# Patient Record
Sex: MALE | Race: ASIAN | NOT HISPANIC OR LATINO | Employment: FULL TIME | URBAN - METROPOLITAN AREA
[De-identification: names, ages, dates, MRNs, and addresses within clinical notes are randomized per-mention and may not be internally consistent; named-entity substitution may affect disease eponyms.]

---

## 2021-03-28 ENCOUNTER — IMMUNIZATIONS (OUTPATIENT)
Dept: FAMILY MEDICINE CLINIC | Facility: HOSPITAL | Age: 21
End: 2021-03-28

## 2021-03-28 DIAGNOSIS — Z23 ENCOUNTER FOR IMMUNIZATION: Primary | ICD-10-CM

## 2021-03-28 PROCEDURE — 0001A SARS-COV-2 / COVID-19 MRNA VACCINE (PFIZER-BIONTECH) 30 MCG: CPT

## 2021-03-28 PROCEDURE — 91300 SARS-COV-2 / COVID-19 MRNA VACCINE (PFIZER-BIONTECH) 30 MCG: CPT

## 2021-03-31 ENCOUNTER — NURSE TRIAGE (OUTPATIENT)
Dept: OTHER | Facility: OTHER | Age: 21
End: 2021-03-31

## 2021-03-31 DIAGNOSIS — B34.9 VIRAL SYNDROME: ICD-10-CM

## 2021-03-31 DIAGNOSIS — B34.9 VIRAL SYNDROME: Primary | ICD-10-CM

## 2021-03-31 PROCEDURE — U0005 INFEC AGEN DETEC AMPLI PROBE: HCPCS | Performed by: FAMILY MEDICINE

## 2021-03-31 PROCEDURE — U0003 INFECTIOUS AGENT DETECTION BY NUCLEIC ACID (DNA OR RNA); SEVERE ACUTE RESPIRATORY SYNDROME CORONAVIRUS 2 (SARS-COV-2) (CORONAVIRUS DISEASE [COVID-19]), AMPLIFIED PROBE TECHNIQUE, MAKING USE OF HIGH THROUGHPUT TECHNOLOGIES AS DESCRIBED BY CMS-2020-01-R: HCPCS | Performed by: FAMILY MEDICINE

## 2021-03-31 NOTE — TELEPHONE ENCOUNTER
1  Were you within 6 feet or less, for up to 15 minutes or more with a person that has a confirmed COVID-19 test?     No known exposure    2  What was the date of your exposure? No exposure; symptoms started after receiving vaccine    3  Are you experiencing any symptoms attributed to the virus?  (Assess for SOB, cough, fever, difficulty breathing)     Headache, coughing, fatigue, fever (up to 101F previously, 98F today), chills    4   HIGH RISK: Do you have any history heart or lung conditions, weakened immune system, diabetes, Asthma, CHF, HIV, COPD, Chemo, renal failure, sickle cell, etc?     Denies

## 2021-03-31 NOTE — TELEPHONE ENCOUNTER
Regarding: SYMPTOMATIC - HEADACHE - COVID TEST REQUEST  ----- Message from [facility] sent at 3/31/2021 10:56 AM EDT -----  "I need to be tested having COVID symptoms; headache, coughing, muscle pain in back, fatigue " However he got his first shot Servando 3/28

## 2021-03-31 NOTE — TELEPHONE ENCOUNTER
Patient advised that he needs to stay home from work until he receives his test results, at minimum, he verbalized understanding of instructions  Patient states he does have health insurance but it is his parent's policy and he does not currently have the information  Billing office number was provided so that he can call with the information when it is available

## 2021-04-01 ENCOUNTER — TELEPHONE (OUTPATIENT)
Dept: OTHER | Facility: OTHER | Age: 21
End: 2021-04-01

## 2021-04-01 LAB — SARS-COV-2 RNA RESP QL NAA+PROBE: POSITIVE

## 2021-04-01 NOTE — TELEPHONE ENCOUNTER
Your test for the novel coronavirus, also known as COVID-19, was positive  The sample showed that the virus was present  Positive COVID-19 test results are reportable to the [place] Department of Health  You may receive a call from trained public health staff to conduct an interview  It is important to answer their call  They will ask you to verify who you are  During the call they will ask you about what symptoms you have, what you did before you got sick, and who you were close to while sick  The health department does this to make sure everyone stays healthy and to reduce the spread of the virus  If you would like to verify if the caller does in fact work in contact tracing, call the [address] [place] [address] at [employer] ([phone])  For additional information, please visit the [employer] website: www health [place] gov     If you have any additional questions, we can schedule a virtual visit for you with a provider or call the [facility] [phone], option [phone], for care advice    For additional information, please visit the Coronavirus FAQ on the [facility] home page ([email or web address])

## 2021-04-18 ENCOUNTER — IMMUNIZATIONS (OUTPATIENT)
Dept: FAMILY MEDICINE CLINIC | Facility: HOSPITAL | Age: 21
End: 2021-04-18

## 2021-04-18 DIAGNOSIS — Z23 ENCOUNTER FOR IMMUNIZATION: Primary | ICD-10-CM

## 2021-04-18 PROCEDURE — 0002A SARS-COV-2 / COVID-19 MRNA VACCINE (PFIZER-BIONTECH) 30 MCG: CPT

## 2021-04-18 PROCEDURE — 91300 SARS-COV-2 / COVID-19 MRNA VACCINE (PFIZER-BIONTECH) 30 MCG: CPT

## 2021-11-27 ENCOUNTER — IMMUNIZATIONS (OUTPATIENT)
Dept: FAMILY MEDICINE CLINIC | Facility: HOSPITAL | Age: 21
End: 2021-11-27

## 2021-11-27 DIAGNOSIS — Z23 ENCOUNTER FOR IMMUNIZATION: Primary | ICD-10-CM

## 2021-11-27 PROCEDURE — 91300 COVID-19 PFIZER VACC 0.3 ML: CPT

## 2021-11-27 PROCEDURE — 0001A COVID-19 PFIZER VACC 0.3 ML: CPT

## 2022-06-22 ENCOUNTER — OFFICE VISIT (OUTPATIENT)
Dept: INTERNAL MEDICINE CLINIC | Facility: CLINIC | Age: 22
End: 2022-06-22
Payer: COMMERCIAL

## 2022-06-22 VITALS
DIASTOLIC BLOOD PRESSURE: 82 MMHG | HEART RATE: 80 BPM | WEIGHT: 163.8 LBS | HEIGHT: 67 IN | BODY MASS INDEX: 25.71 KG/M2 | OXYGEN SATURATION: 98 % | TEMPERATURE: 97.7 F | SYSTOLIC BLOOD PRESSURE: 130 MMHG

## 2022-06-22 DIAGNOSIS — Z11.59 NEED FOR HEPATITIS C SCREENING TEST: ICD-10-CM

## 2022-06-22 DIAGNOSIS — Z00.00 ROUTINE GENERAL MEDICAL EXAMINATION AT A HEALTH CARE FACILITY: ICD-10-CM

## 2022-06-22 DIAGNOSIS — E55.9 VITAMIN D DEFICIENCY: ICD-10-CM

## 2022-06-22 DIAGNOSIS — Z91.018 FOOD ALLERGY: ICD-10-CM

## 2022-06-22 DIAGNOSIS — Z00.00 WELLNESS EXAMINATION: Primary | ICD-10-CM

## 2022-06-22 DIAGNOSIS — R21 RASH: ICD-10-CM

## 2022-06-22 DIAGNOSIS — Z11.4 SCREENING FOR HIV (HUMAN IMMUNODEFICIENCY VIRUS): ICD-10-CM

## 2022-06-22 PROCEDURE — 3725F SCREEN DEPRESSION PERFORMED: CPT | Performed by: INTERNAL MEDICINE

## 2022-06-22 PROCEDURE — 99385 PREV VISIT NEW AGE 18-39: CPT | Performed by: INTERNAL MEDICINE

## 2022-06-22 PROCEDURE — 1036F TOBACCO NON-USER: CPT | Performed by: INTERNAL MEDICINE

## 2022-06-22 PROCEDURE — 3008F BODY MASS INDEX DOCD: CPT | Performed by: INTERNAL MEDICINE

## 2022-06-22 RX ORDER — DESLORATADINE 5 MG/1
5 TABLET ORAL DAILY
COMMUNITY

## 2022-06-22 RX ORDER — EPINEPHRINE 0.3 MG/.3ML
0.3 INJECTION SUBCUTANEOUS ONCE
Qty: 0.6 ML | Refills: 5 | Status: SHIPPED | OUTPATIENT
Start: 2022-06-22 | End: 2022-06-23 | Stop reason: SDUPTHER

## 2022-06-22 NOTE — PROGRESS NOTES
Assessment/Plan:    Wellness examination   Discussed preventative health, cancer screening, immunizations, and safety issues  I recommend yearly flu shot  I recommend Tdap vaccination if not done in the past 10 years  Diagnoses and all orders for this visit:    Wellness examination    Food allergy  -     EPINEPHrine (EPIPEN) 0 3 mg/0 3 mL SOAJ; Inject 0 3 mL (0 3 mg total) into a muscle once for 1 dose    Need for hepatitis C screening test  -     Hepatitis C Antibody ([facility]); Future    Screening for HIV (human immunodeficiency virus)  -     HIV 1/2 Antigen/Antibody (4th Generation) w Reflex [facility]; Future    Routine general medical examination at a health care facility  -     CBC and differential; Future  -     Comprehensive metabolic panel; Future  -     Lipid Panel with Direct LDL reflex; Future  -     TSH, 3rd generation with Free T4 reflex; Future    Vitamin D deficiency  -     Vitamin D 25 hydroxy; Future    Rash  -     Ambulatory Referral to Dermatology; Future    Other orders  -     desloratadine (CLARINEX) 5 MG tablet; Take 5 mg by mouth daily      BMI Counseling: Body mass index is 26 04 kg/m²  The BMI is above normal  Nutrition recommendations include encouraging healthy choices of fruits and vegetables and moderation in carbohydrate intake  Exercise recommendations include exercising 3-5 times per week  Rationale for BMI follow-up plan is due to patient being overweight or obese  Depression Screening and Follow-up Plan: Patient was screened for depression during today's encounter  They screened negative with a PHQ-2 score of 0  Subjective:      Patient ID: Celena Cedeño is a 25 y o  male  Wellness:  Patient sees a dentist regularly, no smoking cigarettes, patient eats healthy diet and exercises    Pt reports he had an allergic reaction to pistachios in the past and wants an epipen        The following portions of the patient's history were reviewed and updated as appropriate: allergies, current medications, past family history, past medical history, past social history, past surgical history and problem list     Review of Systems      Objective:      /82 (BP Location: Left arm, Patient Position: Sitting, Cuff Size: Adult)   Pulse 80   Temp 97 7 °F (36 5 °C) (Probe)   Ht 5' 6 5" (1 689 m)   Wt 74 3 kg (163 lb 12 8 oz)   SpO2 98%   BMI 26 04 kg/m²          Physical Exam  Vitals reviewed  Constitutional:       General: He is not in acute distress  Appearance: Normal appearance  He is well-developed  HENT:      Head: Normocephalic and atraumatic  Right Ear: External ear normal       Left Ear: External ear normal       Nose: Nose normal    Eyes:      General: No scleral icterus  Conjunctiva/sclera: Conjunctivae normal    Neck:      Thyroid: No thyromegaly  Trachea: No tracheal deviation  Cardiovascular:      Rate and Rhythm: Normal rate and regular rhythm  Heart sounds: Normal heart sounds  No murmur heard  Pulmonary:      Effort: Pulmonary effort is normal  No respiratory distress  Breath sounds: Normal breath sounds  No wheezing or rales  Abdominal:      General: Bowel sounds are normal       Palpations: Abdomen is soft  Tenderness: There is no abdominal tenderness  There is no guarding or rebound  Hernia: There is no hernia in the left inguinal area or right inguinal area  Genitourinary:     Testes: Normal    Musculoskeletal:      Cervical back: Normal range of motion and neck supple  Right lower leg: No edema  Left lower leg: No edema  Lymphadenopathy:      Cervical: No cervical adenopathy  Skin:     Coloration: Skin is not jaundiced or pale  Comments: Some scratched open papules scattered across upper back   Neurological:      General: No focal deficit present  Mental Status: He is alert and oriented to person, place, and time     Psychiatric:         Mood and Affect: Mood normal          Behavior: Behavior normal          Thought Content:  Thought content normal          Judgment: Judgment normal

## 2022-06-22 NOTE — PATIENT INSTRUCTIONS
Problem List Items Addressed This Visit          Other    Wellness examination - Primary      Discussed preventative health, cancer screening, immunizations, and safety issues  I recommend yearly flu shot  I recommend Tdap vaccination if not done in the past 10 years                   Other Visit Diagnoses       Food allergy        Relevant Medications    EPINEPHrine (EPIPEN) 0 3 mg/0 3 mL SOAJ    Need for hepatitis C screening test        Relevant Orders    Hepatitis C Antibody ([facility])    Screening for HIV (human immunodeficiency virus)        Relevant Orders    HIV 1/2 Antigen/Antibody (4th Generation) w Reflex [facility]    Routine general medical examination at a health care facility        Relevant Orders    CBC and differential    Comprehensive metabolic panel    Lipid Panel with Direct LDL reflex    TSH, 3rd generation with Free T4 reflex    Vitamin D deficiency        Relevant Orders    Vitamin D 25 hydroxy    Rash        Relevant Orders    Ambulatory Referral to Dermatology

## 2022-06-22 NOTE — ASSESSMENT & PLAN NOTE
Discussed preventative health, cancer screening, immunizations, and safety issues  I recommend yearly flu shot  I recommend Tdap vaccination if not done in the past 10 years

## 2022-06-23 DIAGNOSIS — Z91.018 FOOD ALLERGY: ICD-10-CM

## 2022-06-23 RX ORDER — EPINEPHRINE 0.3 MG/.3ML
0.3 INJECTION SUBCUTANEOUS ONCE
Qty: 0.6 ML | Refills: 5 | Status: SHIPPED | OUTPATIENT
Start: 2022-06-23 | End: 2022-06-23

## 2022-07-09 ENCOUNTER — APPOINTMENT (OUTPATIENT)
Dept: LAB | Facility: CLINIC | Age: 22
End: 2022-07-09
Payer: COMMERCIAL

## 2022-07-09 DIAGNOSIS — Z11.59 NEED FOR HEPATITIS C SCREENING TEST: ICD-10-CM

## 2022-07-09 DIAGNOSIS — Z00.00 ROUTINE GENERAL MEDICAL EXAMINATION AT A HEALTH CARE FACILITY: ICD-10-CM

## 2022-07-09 DIAGNOSIS — E55.9 VITAMIN D DEFICIENCY: ICD-10-CM

## 2022-07-09 DIAGNOSIS — Z11.4 SCREENING FOR HIV (HUMAN IMMUNODEFICIENCY VIRUS): ICD-10-CM

## 2022-07-09 LAB
25(OH)D3 SERPL-MCNC: 18 NG/ML (ref 30–100)
ALBUMIN SERPL BCP-MCNC: 4.4 G/DL (ref 3.5–5)
ALP SERPL-CCNC: 44 U/L (ref 34–104)
ALT SERPL W P-5'-P-CCNC: 15 U/L (ref 7–52)
ANION GAP SERPL CALCULATED.3IONS-SCNC: 6 MMOL/L (ref 4–13)
AST SERPL W P-5'-P-CCNC: 17 U/L (ref 13–39)
BASOPHILS # BLD AUTO: 0.04 THOUSANDS/ΜL (ref 0–0.1)
BASOPHILS NFR BLD AUTO: 1 % (ref 0–1)
BILIRUB SERPL-MCNC: 0.81 MG/DL (ref 0.2–1)
BUN SERPL-MCNC: 18 MG/DL (ref 5–25)
CALCIUM SERPL-MCNC: 9.3 MG/DL (ref 8.4–10.2)
CHLORIDE SERPL-SCNC: 102 MMOL/L (ref 96–108)
CHOLEST SERPL-MCNC: 178 MG/DL
CO2 SERPL-SCNC: 31 MMOL/L (ref 21–32)
CREAT SERPL-MCNC: 0.91 MG/DL (ref 0.6–1.3)
EOSINOPHIL # BLD AUTO: 0.09 THOUSAND/ΜL (ref 0–0.61)
EOSINOPHIL NFR BLD AUTO: 1 % (ref 0–6)
ERYTHROCYTE [DISTWIDTH] IN BLOOD BY AUTOMATED COUNT: 12.7 % (ref 11.6–15.1)
GFR SERPL CREATININE-BSD FRML MDRD: 119 ML/MIN/1.73SQ M
GLUCOSE P FAST SERPL-MCNC: 80 MG/DL (ref 65–99)
HCT VFR BLD AUTO: 52.8 % (ref 36.5–49.3)
HCV AB SER QL: NORMAL
HDLC SERPL-MCNC: 45 MG/DL
HGB BLD-MCNC: 16.9 G/DL (ref 12–17)
IMM GRANULOCYTES # BLD AUTO: 0.02 THOUSAND/UL (ref 0–0.2)
IMM GRANULOCYTES NFR BLD AUTO: 0 % (ref 0–2)
LDLC SERPL CALC-MCNC: 88 MG/DL (ref 0–100)
LYMPHOCYTES # BLD AUTO: 2.21 THOUSANDS/ΜL (ref 0.6–4.47)
LYMPHOCYTES NFR BLD AUTO: 36 % (ref 14–44)
MCH RBC QN AUTO: 30.7 PG (ref 26.8–34.3)
MCHC RBC AUTO-ENTMCNC: 32 G/DL (ref 31.4–37.4)
MCV RBC AUTO: 96 FL (ref 82–98)
MONOCYTES # BLD AUTO: 0.49 THOUSAND/ΜL (ref 0.17–1.22)
MONOCYTES NFR BLD AUTO: 8 % (ref 4–12)
NEUTROPHILS # BLD AUTO: 3.38 THOUSANDS/ΜL (ref 1.85–7.62)
NEUTS SEG NFR BLD AUTO: 54 % (ref 43–75)
NRBC BLD AUTO-RTO: 0 /100 WBCS
PLATELET # BLD AUTO: 321 THOUSANDS/UL (ref 149–390)
PMV BLD AUTO: 9.2 FL (ref 8.9–12.7)
POTASSIUM SERPL-SCNC: 3.9 MMOL/L (ref 3.5–5.3)
PROT SERPL-MCNC: 7.3 G/DL (ref 6.4–8.4)
RBC # BLD AUTO: 5.5 MILLION/UL (ref 3.88–5.62)
SODIUM SERPL-SCNC: 139 MMOL/L (ref 135–147)
TRIGL SERPL-MCNC: 225 MG/DL
TSH SERPL DL<=0.05 MIU/L-ACNC: 1.2 UIU/ML (ref 0.45–4.5)
WBC # BLD AUTO: 6.23 THOUSAND/UL (ref 4.31–10.16)

## 2022-07-09 PROCEDURE — 80053 COMPREHEN METABOLIC PANEL: CPT

## 2022-07-09 PROCEDURE — 80061 LIPID PANEL: CPT

## 2022-07-09 PROCEDURE — 84443 ASSAY THYROID STIM HORMONE: CPT

## 2022-07-09 PROCEDURE — 82306 VITAMIN D 25 HYDROXY: CPT

## 2022-07-09 PROCEDURE — 86803 HEPATITIS C AB TEST: CPT

## 2022-07-09 PROCEDURE — 85025 COMPLETE CBC W/AUTO DIFF WBC: CPT

## 2022-07-09 PROCEDURE — 87389 HIV-1 AG W/HIV-1&-2 AB AG IA: CPT

## 2022-07-09 PROCEDURE — 36415 COLL VENOUS BLD VENIPUNCTURE: CPT

## 2022-07-11 LAB — HIV 1+2 AB+HIV1 P24 AG SERPL QL IA: NORMAL

## 2023-02-24 ENCOUNTER — OFFICE VISIT (OUTPATIENT)
Dept: INTERNAL MEDICINE CLINIC | Facility: CLINIC | Age: 23
End: 2023-02-24

## 2023-02-24 VITALS
BODY MASS INDEX: 27.1 KG/M2 | SYSTOLIC BLOOD PRESSURE: 122 MMHG | DIASTOLIC BLOOD PRESSURE: 80 MMHG | RESPIRATION RATE: 16 BRPM | OXYGEN SATURATION: 99 % | HEIGHT: 66 IN | WEIGHT: 168.6 LBS | HEART RATE: 103 BPM | TEMPERATURE: 97 F

## 2023-02-24 DIAGNOSIS — Z23 ENCOUNTER FOR IMMUNIZATION: ICD-10-CM

## 2023-02-24 DIAGNOSIS — M54.31 SCIATICA, RIGHT SIDE: Primary | ICD-10-CM

## 2023-02-24 RX ORDER — METHOCARBAMOL 500 MG/1
500 TABLET, FILM COATED ORAL 3 TIMES DAILY PRN
Qty: 30 TABLET | Refills: 1 | Status: SHIPPED | OUTPATIENT
Start: 2023-02-24

## 2023-02-24 NOTE — PROGRESS NOTES
Name: Dina Cedeño      : 2000      MRN: [id number]  Encounter Provider: Carlos Marcelino MD  Encounter Date: 2023   Encounter department: [facility] [place]     1  Sciatica, right side  Assessment & Plan: Will refer to physical therapy for evaluation  I recommend topical Voltaren  Muscle relaxant given to use if having difficulty sleeping with this pain  Orders:  -     Ambulatory Referral to Physical Therapy; Future  -     methocarbamol (ROBAXIN) 500 mg tablet; Take 1 tablet (500 mg total) by mouth 3 (three) times a day as needed for muscle spasms    2  Encounter for immunization           Subjective     Having lower back pain for a few months, worsening recently  Patient does not remember any specific injury or strain  No fevers or chills  No pelvic numbness, no bladder or bowel incontinence  Pain is in the right lower back and radiates down the posterior lateral right leg  No history of this  Pt has been going to chiropracter  He also had xrays    Back Pain  This is a new problem  The current episode started more than 1 month ago  The problem occurs constantly  The problem has been gradually improving since onset  The pain is present in the gluteal and sacro-iliac  The quality of the pain is described as aching, burning, shooting and stabbing  The pain radiates to the right foot  The pain is at a severity of 8/10  The pain is worse during the night  The symptoms are aggravated by bending, position and twisting  Stiffness is present in the morning  Associated symptoms include leg pain, numbness, tingling and weakness  Pertinent negatives include no abdominal pain, bladder incontinence, bowel incontinence, chest pain, dysuria, fever, headaches, paresis, paresthesias, pelvic pain, perianal numbness or weight loss  Review of Systems   Constitutional: Negative for chills, fever and weight loss  Cardiovascular: Negative for chest pain  Gastrointestinal: Negative for abdominal pain, bowel incontinence, constipation and diarrhea  Genitourinary: Negative for bladder incontinence, dysuria and pelvic pain  Musculoskeletal: Positive for back pain  Skin: Negative for rash  Neurological: Positive for tingling, weakness and numbness  Negative for headaches and paresthesias  History reviewed  No pertinent past medical history    Past Surgical History:   Procedure Laterality Date   • SHOULDER SURGERY Left     as a baby     Family History   Problem Relation Age of Onset   • No Known Problems Mother    • No Known Problems Father      Social History     Socioeconomic History   • Marital status: Single     Spouse name: None   • Number of children: None   • Years of education: None   • Highest education level: None   Occupational History   • None   Tobacco Use   • Smoking status: Never     Passive exposure: Never   • Smokeless tobacco: Never   Vaping Use   • Vaping Use: Never used   Substance and Sexual Activity   • Alcohol use: Never   • Drug use: Never   • Sexual activity: Not Currently     Partners: Female   Other Topics Concern   • None   Social History Narrative   • None     Social Determinants of Health     Financial Resource Strain: Not on file   Food Insecurity: Not on file   Transportation Needs: Not on file   Physical Activity: Not on file   Stress: Not on file   Social Connections: Not on file   Intimate Partner Violence: Not on file   Housing Stability: Not on file     Current Outpatient Medications on File Prior to Visit   Medication Sig   • desloratadine (CLARINEX) 5 MG tablet Take 5 mg by mouth daily   • EPINEPHrine (EPIPEN) 0 3 mg/0 3 mL SOAJ Inject 0 3 mL (0 3 mg total) into a muscle once for 1 dose     Allergies   Allergen Reactions   • Pistachio Nut (Diagnostic) - Food Allergy Facial Swelling     Immunization History   Administered Date(s) Administered   • COVID-19 PFIZER VACCINE 0 3 ML IM 03/28/2021, 04/18/2021, 11/27/2021   • H1N1, All Formulations 01/08/2010   • INFLUENZA 10/16/2013, 11/14/2015, 11/12/2016, 11/11/2017       Objective     /80 (BP Location: Left arm, Patient Position: Sitting, Cuff Size: Large)   Pulse 103   Temp (!) 97 °F (36 1 °C) (Tympanic)   Resp 16   Ht 5' 5 6" (1 666 m)   Wt 76 5 kg (168 lb 9 6 oz)   SpO2 99%   BMI 27 55 kg/m²     Physical Exam  Constitutional:       General: He is not in acute distress  Appearance: Normal appearance  He is well-developed  HENT:      Head: Normocephalic and atraumatic  Right Ear: External ear normal       Left Ear: External ear normal    Eyes:      General: No scleral icterus  Conjunctiva/sclera: Conjunctivae normal    Neck:      Thyroid: No thyromegaly  Trachea: No tracheal deviation  Cardiovascular:      Rate and Rhythm: Normal rate and regular rhythm  Heart sounds: Normal heart sounds  Pulmonary:      Effort: Pulmonary effort is normal  No respiratory distress  Breath sounds: Normal breath sounds  No wheezing or rales  Abdominal:      General: Abdomen is flat  Bowel sounds are normal       Palpations: Abdomen is soft  Tenderness: There is no abdominal tenderness  There is no guarding or rebound  Musculoskeletal:      Cervical back: Normal range of motion and neck supple  Comments: Patient does have pain when straight leg raising on the right, but pain is in the sciatic notch area, negative PALMER test, no tenderness over spine, 5 out of 5 strength in lower extremity   Lymphadenopathy:      Cervical: No cervical adenopathy  Neurological:      Mental Status: He is alert and oriented to person, place, and time  Psychiatric:         Behavior: Behavior normal          Thought Content:  Thought content normal          Judgment: Judgment normal        Jaswant Kat MD

## 2023-02-24 NOTE — PATIENT INSTRUCTIONS
Problem List Items Addressed This Visit          Nervous and Auditory    Sciatica, right side - Primary     Will refer to physical therapy for evaluation  I recommend topical Voltaren  Muscle relaxant given to use if having difficulty sleeping with this pain           Relevant Medications    methocarbamol (ROBAXIN) 500 mg tablet    Other Relevant Orders    Ambulatory Referral to Physical Therapy     Other Visit Diagnoses       Encounter for immunization

## 2023-02-24 NOTE — ASSESSMENT & PLAN NOTE
Will refer to physical therapy for evaluation  I recommend topical Voltaren  Muscle relaxant given to use if having difficulty sleeping with this pain

## 2023-03-08 ENCOUNTER — EVALUATION (OUTPATIENT)
Dept: PHYSICAL THERAPY | Facility: CLINIC | Age: 23
End: 2023-03-08

## 2023-03-08 DIAGNOSIS — M54.16 LUMBAR RADICULOPATHY: Primary | ICD-10-CM

## 2023-03-08 DIAGNOSIS — M54.31 SCIATICA, RIGHT SIDE: ICD-10-CM

## 2023-03-08 NOTE — PROGRESS NOTES
PT Evaluation     Today's date: 3/8/2023  Patient name: Kirill Cedeño  : 2000  MRN: [id number]  Referring provider: Larisa Pretty MD  Dx:   Encounter Diagnosis     ICD-10-CM    1  Lumbar radiculopathy  M54 16                      Assessment  Assessment details: Pt presents with signs and symptoms synonymous of admitting diagnosis as well as mechanical diagnosis of posterior derangement with DP of REIL with Pt OP  Pt presents with pain, decreased strength, decreased range, flexibility, neural tension as well as tolerance to activity and postural awareness  Pt would benefit skilled PT intervention in order to address these impairments in order to be able to perform all desired activities with minimal to nil symptom exacerbation  Based on today's session he is likely to have a strong outcome, if he fails to find improvement in the next 3-4 weeks appropriate referral will be performed  Thank you very much for this kind and motivated referral      Impairments: abnormal or restricted ROM, activity intolerance, impaired physical strength, lacks appropriate home exercise program, pain with function, poor posture  and poor body mechanics  Understanding of Dx/Px/POC: good   Prognosis: good    Goals  STG 4 Weeks:  Decrease pain at worst to 6  Improve range to all LS planes to min  Improve strength to improve R LE to 4/5  Independent with HEP  LTG 8 Weeks:  Decrease pain at worst to 3  Improve range to improve LS range to   Improve strength to R LE to 5/5     Able to perform all desired activities with minimal to nil symptom exacerbation      Plan  Patient would benefit from: skilled physical therapy  Planned modality interventions: cryotherapy, TENS and thermotherapy: hydrocollator packs  Planned therapy interventions: joint mobilization, manual therapy, abdominal trunk stabilization, neuromuscular re-education, patient education, postural training, strengthening, stretching, therapeutic activities, therapeutic exercise, therapeutic training, home exercise program, graded motor, graded exercise, graded activity, functional ROM exercises, flexibility, activity modification, behavior modification and body mechanics training  Frequency: 2x week  Duration in weeks: 10  Treatment plan discussed with: patient        Subjective Evaluation    History of Present Illness  Date of onset: 3/8/2023  Mechanism of injury: Pt is a 22 yomale who presents today stating that he developed R leg and numbness began in December, Back pain began in January  Pt reports that he has had R >L numbness that came and went all within a day  Pt reports this was all insidious in nature, no trauma that he can recall  Pt reports that he has been to Chiropractic which he was educated about [employer] and he has been doing this 1x a day which he indicates it helps but then symptoms return  X-rays were performed and (-) of abnormality  Pt reports he went to his family physician and he received medication for his back to assist with sleep, not using  Pt reports that he has been using medication only when it is really bad, OTC  Pt reports moving is good, static positions make him worse he also states that waking up in the AM is the worst in back and leg pain, symptoms do not go further then calf  Pt reports symptom improve with walking and moving and as the day progresses  Pt reports that as long as the position is proper, he is without difficulty sleeping, but laying on stomach his back hurts, leg is okay, pt reports once asleep he is not awakening due to pain  Pt reports no change in bowel or bladder  No symptoms on the L LE this episode  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, prefers moving and static sitting is going to make the sit to stand transfers that much harder   Denies change in bowel or bladder and goals at this time are to reduce pain, improve sitting and standing tolerance, improve mobility, and improve awakening in the morning  Quality of life: good    Pain  Current pain ratin  At best pain rating: 3 (Constant low back, intermittent leg pain  )  At worst pain ratin  Quality: needle-like, sharp and dull ache  Relieving factors: medications and change in position  Aggravating factors: sitting, standing and lifting (bending over  )  Progression: improved      Diagnostic Tests  X-ray: normal  Treatments  Previous treatment: chiropractic  Patient Goals  Patient goals for therapy: decreased pain, increased motion, increased strength and return to sport/leisure activities          Objective     Active Range of Motion     Lumbar   Flexion:  with pain Restriction level: moderate  Extension:  with pain Restriction level: maximal  Left lateral flexion:  with pain Restriction level: moderate  Right lateral flexion:  with pain Restriction level: minimal    Additional Active Range of Motion Details  Forward head, rounded shoulders, Lordosis  B/L Sensation intact to L3,4,5,S1,S2  DTR Patella 1+ L 2+ R  Josue L 3+ R 2+  Postural correction R low back , P R Leg symptoms NW  With walking R calve symptoms  Repeated motion   Flex  Ext REIL P R leg NB  UE Blocks P NB   REIL Pt Op D B  Better range, strength, and R calve symptoms abolished  SB R  SB L  LE Strength  Hip   L 5 Flex 5 Ext 5 Abd 5 Add  R 4* Flex 5*Ext 4Abd 5 Add  LE Screen  Pain with Knee flexion R, rest of R LE 5/5  L LE strong and painless  Joint Mobility  Palpation  STs  + Slump R  Precautions: Fall Concern, L UE surgery as a baby          Manuals 3/8/23                                                                Neuro Re-Ed             Postural Ed and Progression 10 min            Obstruction education performed            Back Account Analogy performed            Sleeping education performed            Cut finger perfromed                                      Ther Ex             REIL 4 x 10 NB            REIL Pt OP 5 x 10 D B Progression? Possible Lateral (continue to monitor)                          Sustained?              Ryan Slider R  1"           Ther Activity             LS Flex SGIS R B            LE Strength B            Ryan R B                                      Modalities             CP/HP prn to LS

## 2023-03-13 ENCOUNTER — OFFICE VISIT (OUTPATIENT)
Dept: PHYSICAL THERAPY | Facility: CLINIC | Age: 23
End: 2023-03-13

## 2023-03-13 DIAGNOSIS — M54.31 SCIATICA, RIGHT SIDE: ICD-10-CM

## 2023-03-13 DIAGNOSIS — M54.16 LUMBAR RADICULOPATHY: Primary | ICD-10-CM

## 2023-03-13 NOTE — PROGRESS NOTES
Daily Note     Today's date: 3/13/2023  Patient name: Angelica Cedeño  : 2000  MRN: [id number]  Referring provider: Jose Mendez MD  Dx:   Encounter Diagnosis     ICD-10-CM    1  Lumbar radiculopathy  M54 16       2  Sciatica, right side  M54 31                      Subjective: Pt reports as a whole he is feeling better  Every 2 hours he is performing HEP at home, and SHUN UE support  Pt reports mid walking is getting better  AM stiffness still a challenge  Objective: See treatment diary below  Mild LS and L Glute symptoms  REIL PT OP with UE Blocks P R LE symptoms  R SGIS D B R LE symptoms  REIL + UE Block     Assessment:  Pt displayed lateral component with Pt OP, able to improve and return to Sagital all within session  Not available to return later this week or early next week  Will follow up on 3/24/23  Sent home to continue with REIL every 2 hours x 10 reps  If he is to peripheralize then return to R SGIS  Pt in complete accord  Precautions: Fall Concern, L UE surgery as a baby  Manuals 3/8/23 3/13                                                               Neuro Re-Ed             Postural Ed and Progression 10 min 10 min           Obstruction education performed            Back Account Analogy performed            Sleeping education performed            Cut finger perfromed                                      Ther Ex             REIL 4 x 10 NB See O (without symptoms)           REIL Pt OP 5 x 10 D B P R LE 3 x 10           Progression? Possible Lateral (continue to monitor)  R SGIS 4 x 10 D B                        Sustained? 5 min P resolved < 1 min           Slump Slider R  1" ?            Ther Activity             LS Flex SGIS R B            LE Strength B            Slump R B                                      Modalities             CP/HP prn to LS

## 2023-03-24 ENCOUNTER — OFFICE VISIT (OUTPATIENT)
Dept: PHYSICAL THERAPY | Facility: CLINIC | Age: 23
End: 2023-03-24

## 2023-03-24 DIAGNOSIS — M54.31 SCIATICA, RIGHT SIDE: ICD-10-CM

## 2023-03-24 DIAGNOSIS — M54.16 LUMBAR RADICULOPATHY: Primary | ICD-10-CM

## 2023-03-24 NOTE — PROGRESS NOTES
Daily Note     Today's date: 3/24/2023  Patient name: Keisha Cedeño  : 2000  MRN: [id number]  Referring provider: Omero Maradiaga MD  Dx:   Encounter Diagnosis     ICD-10-CM    1  Lumbar radiculopathy  M54 16       2  Sciatica, right side  M54 31                      Subjective: Pt reports that he is feeling better following the IE, maximally compliant with HEP  Objective: See treatment diary below  Present with R glute pain     Assessment: Tolerated treatment well  Patient demonstrated fatigue post treatment      Plan: Continue per plan of care  Precautions: Fall Concern, L UE surgery as a baby  Manuals 3/8/23 3/13 03/24                                                              Neuro Re-Ed             Postural Ed and Progression 10 min 10 min           Obstruction education performed            Back Account Analogy performed            Sleeping education performed            Cut finger perfromed                                      Ther Ex             REIL 4 x 10 NB See O (without symptoms) 3x10  NE           REIL Pt OP 5 x 10 D B P R LE 3 x 10           Progression? EIL c hips off center- for R   4x10   D, B          Possible Lateral (continue to monitor)  R SGIS 4 x 10 D B R SGIS  4x10   I, W                       Sustained? 5 min P resolved < 1 min           Slump Slider R  1" ?            Ther Activity             LS Flex SGIS R B            LE Strength B            Slump R B                                      Modalities             CP/HP prn to LS

## 2023-03-28 ENCOUNTER — OFFICE VISIT (OUTPATIENT)
Dept: PHYSICAL THERAPY | Facility: CLINIC | Age: 23
End: 2023-03-28

## 2023-03-28 DIAGNOSIS — M54.16 LUMBAR RADICULOPATHY: Primary | ICD-10-CM

## 2023-03-28 DIAGNOSIS — M54.31 SCIATICA, RIGHT SIDE: ICD-10-CM

## 2023-03-28 NOTE — PROGRESS NOTES
"Daily Note     Today's date: 3/28/2023  Patient name: Nikhil Cedeño  : 2000  MRN: [id number]  Referring provider: Ramses Melchor MD  Dx:   Encounter Diagnosis     ICD-10-CM    1  Lumbar radiculopathy  M54 16       2  Sciatica, right side  M54 31           Start Time: 0900  Stop Time: 0930  Total time in clinic (min): 30 minutes    Subjective: Pt reports that he has been compliant with EIL with HOC and has noticed no distal radicular symptoms  Objective: See treatment diary below  Presents with R glute pain     Assessment: Tolerated treatment well  Tolerated return to sagittal plane and is improving  Patient demonstrated fatigue post treatment      Plan: Continue per plan of care  Precautions: Fall Concern, L UE surgery as a baby  Manuals 3/8/23 3/13 03/24 03/28                                                             Neuro Re-Ed             Postural Ed and Progression 10 min 10 min  10 min          Obstruction education performed            Back Account Analogy performed   done         Sleeping education performed            Cut finger perfromed   done                                   Ther Ex             REIL 4 x 10 NB See O (without symptoms) 3x10  NE  3x10 NE         REIL Pt OP 5 x 10 D B P R LE 3 x 10  5x10   D B         Progression? EIL c hips off center- for R   4x10   D, B          Possible Lateral (continue to monitor)  R SGIS 4 x 10 D B R SGIS  4x10   I, W                       Sustained? 5 min P resolved < 1 min  5 min D B         Slump Slider R  1\" ?            Ther Activity             LS Flex SGIS R B            LE Strength B            Slump R B                                      Modalities             CP/HP prn to LS                               "

## 2023-04-04 ENCOUNTER — OFFICE VISIT (OUTPATIENT)
Dept: PHYSICAL THERAPY | Facility: CLINIC | Age: 23
End: 2023-04-04

## 2023-04-04 DIAGNOSIS — M54.16 LUMBAR RADICULOPATHY: Primary | ICD-10-CM

## 2023-04-04 DIAGNOSIS — M54.31 SCIATICA, RIGHT SIDE: ICD-10-CM

## 2023-04-04 NOTE — PROGRESS NOTES
"Daily Note     Today's date: 2023  Patient name: Estephanie Cedeño  : 2000  MRN: [id number]  Referring provider: Nayeli Henderson MD  Dx:   Encounter Diagnosis     ICD-10-CM    1  Lumbar radiculopathy  M54 16       2  Sciatica, right side  M54 31           Start Time: 1450  Stop Time: 1525  Total time in clinic (min): 35 minutes    Subjective: Pt reports that he has been feeling better  Has been able to continue EIL c pt Op at home  Objective: See treatment diary below  Presents was symptoamtic  Assessment: Tolerated treatment well  Pt continues to tolerate return to the sagittal plane well  Patient demonstrated fatigue post treatment      Plan: Continue per plan of care  Precautions: Fall Concern, L UE surgery as a baby  Manuals 3/8/23 3/13 03/24 03/28 04/04                                                            Neuro Re-Ed             Postural Ed and Progression 10 min 10 min  10 min  5 min         Obstruction education performed            Back Account Analogy performed   done         Sleeping education performed            Cut finger perfromed   done                                   Ther Ex             REIL 4 x 10 NB See O (without symptoms) 3x10  NE  3x10 NE         REIL Pt OP 5 x 10 D B P R LE 3 x 10  5x10   D B 3x10         Progression? EIL c hips off center- for R   4x10   D, B          Possible Lateral (continue to monitor)  R SGIS 4 x 10 D B R SGIS  4x10   I, W                       Sustained? 5 min P resolved < 1 min  5 min D B 10 min         Slump Slider R  1\" ?            Ther Activity             LS Flex SGIS R B            LE Strength B            Slump R B                                      Modalities             CP/HP prn to LS                               "

## 2023-04-26 ENCOUNTER — OFFICE VISIT (OUTPATIENT)
Dept: PHYSICAL THERAPY | Facility: CLINIC | Age: 23
End: 2023-04-26

## 2023-04-26 DIAGNOSIS — M54.16 LUMBAR RADICULOPATHY: Primary | ICD-10-CM

## 2023-04-26 DIAGNOSIS — M54.31 SCIATICA, RIGHT SIDE: ICD-10-CM

## 2023-04-26 NOTE — PROGRESS NOTES
"Daily Note     Today's date: 2023  Patient name: Sami Cedeño  : 2000  MRN: [id number]  Referring provider: Gena Mcarthur MD  Dx:   Encounter Diagnosis     ICD-10-CM    1  Lumbar radiculopathy  M54 16       2  Sciatica, right side  M54 31           Start Time: 1530  Stop Time: 1600  Total time in clinic (min): 30 minutes    Subjective: Pt notes that his pain continues to be dull in nature  Some LE soreness following last visit's progressions  Objective: See treatment diary below      Assessment: Tolerated treatment well  Fatiguing with current LE strengthening  Patient would benefit from continued PT      Plan: Continue per plan of care  Precautions: Fall Concern, L UE surgery as a baby  Manuals 3/8/23 3/13 03/24 03/28 04/04 4/13 04/21 04/26                                                         Neuro Re-Ed             Postural Ed and Progression 10 min 10 min  10 min  5 min  10 min 10 min  5 min     Obstruction education performed            Back Account Analogy performed   done         Sleeping education performed            Cut finger perfromed   done         Hip AB /ext       2x10  RTB 2x10      Sit to stand        2x10  2x12     Squats        2x10  2x12     Ther Ex             REIL 4 x 10 NB See O (without symptoms) 3x10  NE  3x10 NE         REIL Pt OP 5 x 10 D B P R LE 3 x 10  5x10   D B 3x10  3 x 10 3x10  3x10 with blocks      Progression? EIL c hips off center- for R   4x10   D, B          Possible Lateral (continue to monitor)  R SGIS 4 x 10 D B R SGIS  4x10   I, W                       Sustained? 5 min P resolved < 1 min  5 min D B 10 min  10 min 10 min  10 min      Slump Slider R  1\" ?            Ther Activity             LS Flex SGIS R B            LE Strength B            Slump R B                                      Modalities             CP/HP prn to LS                                      "

## 2023-05-03 ENCOUNTER — EVALUATION (OUTPATIENT)
Dept: PHYSICAL THERAPY | Facility: CLINIC | Age: 23
End: 2023-05-03

## 2023-05-03 DIAGNOSIS — M54.31 SCIATICA, RIGHT SIDE: ICD-10-CM

## 2023-05-03 DIAGNOSIS — M54.16 LUMBAR RADICULOPATHY: Primary | ICD-10-CM

## 2023-05-03 NOTE — PROGRESS NOTES
PT Evaluation     Today's date: 5/3/2023  Patient name: Dilip Cedeño  : 2000  MRN: [id number]  Referring provider: Vidya Muhammad MD  Dx:   Encounter Diagnosis     ICD-10-CM    1  Lumbar radiculopathy  M54 16       2  Sciatica, right side  M54 31                      Assessment  Assessment details: Pt is continuing to progress very well at this time  They have demonstrated further improvement in pain levels, strength, range, flexibility as well as overall tolerance to activity  They have achieved several LTGs sought out for them as well as by them  They have demonstrated their centralization and only having issues with bending forward and are safe to integrate back with flexion based intervention to address scar tissues  HEP is 10-15 reps REIL followed by RFIL  Will benefit further intervention 1x a week to proceed with this process and further strengthening routine    Thank you very much for this kind and motivated referral         Impairments: abnormal or restricted ROM, activity intolerance, impaired physical strength, lacks appropriate home exercise program, pain with function, poor posture  and poor body mechanics  Understanding of Dx/Px/POC: good   Prognosis: good    Goals  STG 4 Weeks:  Decrease pain at worst to 6 -met  Improve range to all LS planes to min -met  Improve strength to improve R LE to 4/5 -met  Independent with HEP -met  LTG 8 Weeks:  Decrease pain at worst to 3 -partial  Improve range to improve LS range to nil -partial  Improve strength to R LE to 5/5  -met  Able to perform all desired activities with minimal to nil symptom exacerbation      Plan  Patient would benefit from: skilled physical therapy  Planned modality interventions: cryotherapy, TENS and thermotherapy: hydrocollator packs  Planned therapy interventions: joint mobilization, manual therapy, abdominal trunk stabilization, neuromuscular re-education, patient education, postural training, strengthening, stretching, therapeutic activities, therapeutic exercise, therapeutic training, home exercise program, graded motor, graded exercise, graded activity, functional ROM exercises, flexibility, activity modification, behavior modification and body mechanics training  Frequency: 1x week  Duration in weeks: 10  Treatment plan discussed with: patient        Subjective Evaluation    History of Present Illness  Date of onset: 3/8/2023  Mechanism of injury: Pt presents today stating he is feeling really well as a whole, still having mild pain, at worst 3-4/10, mostly without pain if there is it is centralized  Pt reports that he is recreationally exercising and cycling, mowing grass  Pt reports that he is getting these symptoms primarily with bending forward but it goes away right away  Pt reports that sleeping is going well, work is going well and he is without issue  Pt reports goals at this time are to essentially be able to be entirely without pain  Quality of life: good    Pain  Current pain ratin  At best pain ratin ( )  At worst pain ratin  Quality: needle-like, sharp and dull ache  Relieving factors: medications and change in position  Aggravating factors: sitting, standing and lifting (bending over  )  Progression: improved      Diagnostic Tests  X-ray: normal  Treatments  Previous treatment: chiropractic  Patient Goals  Patient goals for therapy: decreased pain, increased motion, increased strength and return to sport/leisure activities          Objective     Active Range of Motion     Lumbar   Flexion:  Restriction level: minimal  Extension:  Restriction level: moderate  Left lateral flexion:  Restriction level: minimal  Right lateral flexion:  [facility]    Additional Active Range of Motion Details  Forward head, rounded shoulders, Lordosis  B/L Sensation intact to L3,4,5,S1,S2  DTR Patella 1+ L 2+ R  Josue L 3+ R 2+  Postural correction R low back , P R Leg symptoms NW  With walking R calve symptoms    // nil "today  Repeated motion   Flex  Ext REIL P R leg NB  UE Blocks P NB   REIL Pt Op D B  Better range, strength, and R calve symptoms abolished  //  [place] followed by RFIL  Pain with standing LS Flexion is test  B  (Flex before P NW)  SB R  SB L  LE Strength  Hip   L 5 Flex 5 Ext 5 Abd 5 Add  R 4* Flex 5*Ext 4Abd 5 Add // all 5/5 no pain  -remains  LE Screen  Pain with Knee flexion R, rest of R LE 5/5  L LE strong and painless  Joint Mobility  Palpation  STs  + Slump R   // today nil  Precautions: Fall Concern, L UE surgery as a baby  [email or web address] Access Code: [email or web address]        Manuals 3/8/23 3/13 03/24 03/28 04/04 4/13 04/21 04/26 5/3                                                        Neuro Re-Ed             Postural Ed and Progression 10 min 10 min  10 min  5 min  10 min 10 min  5 min assessment x 15    Obstruction education performed            Back Account Analogy performed   done         Sleeping education performed            Cut finger perfromed   done         Hip AB /ext       2x10  RTB 2x10  nv    Sit to stand        2x10  2x12 nv    Squats        2x10  2x12 nv    Ther Ex             REIL 4 x 10 NB See O (without symptoms) 3x10  NE  3x10 NE         REIL Pt OP 5 x 10 D B P R LE 3 x 10  5x10   D B 3x10  3 x 10 3x10  3x10 with blocks  15 reps REIL  f/u 15 reps RFIL    Progression? EIL c hips off center- for R   4x10   D, B          Possible Lateral (continue to monitor)  R SGIS 4 x 10 D B R SGIS  4x10   I, W                       Sustained? 5 min P resolved < 1 min  5 min D B 10 min  10 min 10 min  10 min      Slump Slider R  1\" ?            Ther Activity             LS Flex SGIS R B            LE Strength B            Slump R B                                      Modalities             CP/HP prn to LS                                      "

## 2023-05-08 ENCOUNTER — OFFICE VISIT (OUTPATIENT)
Dept: PHYSICAL THERAPY | Facility: CLINIC | Age: 23
End: 2023-05-08

## 2023-05-08 DIAGNOSIS — M54.31 SCIATICA, RIGHT SIDE: ICD-10-CM

## 2023-05-08 DIAGNOSIS — M54.16 LUMBAR RADICULOPATHY: Primary | ICD-10-CM

## 2023-05-08 NOTE — PROGRESS NOTES
"Daily Note     Today's date: 2023  Patient name: Roshan Cedeño  : 2000  MRN: [id number]  Referring provider: Tim Shaikh MD  Dx:   Encounter Diagnosis     ICD-10-CM    1  Lumbar radiculopathy  M54 16       2  Sciatica, right side  M54 31           Start Time: 1500  Stop Time: 1545  Total time in clinic (min): 45 minutes    Subjective: Pt reports that he has been doing his new HEP and has noticed a little difference in his flexion tolerance  Objective: See treatment diary below      Assessment: Tolerated treatment well  Still getting pain at end range flexion in standing  Remains asymptomatic throughout strengthening  Patient would benefit from continued PT      Plan: Continue per plan of care  Precautions: Fall Concern, L UE surgery as a baby  [email or web address] Access Code: [email or web address]        Manuals 3/8/23 3/13 03/24 03/28 04/04 4/13 04/21 04/26 5/3 05/08                                                       Neuro Re-Ed             Postural Ed and Progression 10 min 10 min  10 min  5 min  10 min 10 min  5 min assessment x 15    Obstruction education performed            Back Account Analogy performed   done         Sleeping education performed            Cut finger perfromed   done         Hip AB /ext       2x10  RTB 2x10  nv RTB 3x10   Sit to stand        2x10  2x12 nv 3x10   Squats        2x10  2x12 nv 3x10    Ther Ex             REIL 4 x 10 NB See O (without symptoms) 3x10  NE  3x10 NE         REIL Pt OP 5 x 10 D B P R LE 3 x 10  5x10   D B 3x10  3 x 10 3x10  3x10 with blocks  15 reps REIL  f/u 15 reps RFIL 15 reps REIL  f/u 15 reps RFIL   Progression? EIL c hips off center- for R   4x10   D, B          Possible Lateral (continue to monitor)  R SGIS 4 x 10 D B R SGIS  4x10   I, W                       Sustained? 5 min P resolved < 1 min  5 min D B 10 min  10 min 10 min  10 min   10 min   Slump Slider R  1\" ?            Ther Activity             LS Flex SGIS R B    " "        LE Strength B            Slump R B         1\"x10                              Modalities             CP/HP prn to LS                                           "

## 2023-05-12 ENCOUNTER — OFFICE VISIT (OUTPATIENT)
Dept: PHYSICAL THERAPY | Facility: CLINIC | Age: 23
End: 2023-05-12

## 2023-05-12 DIAGNOSIS — M54.31 SCIATICA, RIGHT SIDE: ICD-10-CM

## 2023-05-12 DIAGNOSIS — M54.16 LUMBAR RADICULOPATHY: Primary | ICD-10-CM

## 2023-05-12 NOTE — PROGRESS NOTES
"Daily Note     Today's date: 2023  Patient name: Michael Cedeño  : 2000  MRN: [id number]  Referring provider: Ruddy Golden MD  Dx:   Encounter Diagnosis     ICD-10-CM    1  Lumbar radiculopathy  M54 16       2  Sciatica, right side  M54 31           Start Time: 1430  Stop Time: 1510  Total time in clinic (min): 40 minutes    Subjective: Pt reports that he has been doing good  Still has some pain with lumbar flexion  Objective: See treatment diary below      Assessment: Tolerated treatment well  Tolerance to lumbar flexion pain free following R sciatic n  Glide  Patient would benefit from continued PT      Plan: Continue per plan of care  Will resume PT when home from vacation  Precautions: Fall Concern, L UE surgery as a baby  [email or web address] Access Code: [email or web address]        Manuals 05/12  03/24 03/28 04/04 4/13 04/21 04/26 5/3 05/08                                                       Neuro Re-Ed             Postural Ed and Progression    10 min  5 min  10 min 10 min  5 min assessment x 15    Obstruction education             Back Account Analogy    done         Sleeping education             Cut finger    done         Hip AB /ext GTB  2x10      2x10  RTB 2x10  nv RTB 3x10   Sit to stand  3x10      2x10  2x12 nv 3x10   Squats  3x10      2x10  2x12 nv 3x10    Ther Ex             REIL   3x10  NE  3x10 NE         REIL Pt OP 15 reps REIL  f/u 15 reps RFIL   5x10   D B 3x10  3 x 10 3x10  3x10 with blocks  15 reps REIL  f/u 15 reps RFIL 15 reps REIL  f/u 15 reps RFIL   Progression? EIL c hips off center- for R   4x10   D, B          Possible Lateral (continue to monitor)   R SGIS  4x10   I, W                       Sustained?  10 min    5 min D B 10 min  10 min 10 min  10 min   10 min   Slump Slider R 1\"x10         1\"x10   Ther Activity             LS Flex SGIS R             LE Strength                                                     Modalities             CP/HP prn to " LS

## 2023-05-23 ENCOUNTER — OFFICE VISIT (OUTPATIENT)
Dept: PHYSICAL THERAPY | Facility: CLINIC | Age: 23
End: 2023-05-23

## 2023-05-23 DIAGNOSIS — M54.31 SCIATICA, RIGHT SIDE: Primary | ICD-10-CM

## 2023-05-23 DIAGNOSIS — M54.16 LUMBAR RADICULOPATHY: ICD-10-CM

## 2023-05-23 NOTE — PROGRESS NOTES
"Daily Note     Today's date: 2023  Patient name: Handy Cedeño  : 2000  MRN: [id number]  Referring provider: Lesly Hunter MD  Dx:   Encounter Diagnosis     ICD-10-CM    1  Sciatica, right side  M54 31       2  Lumbar radiculopathy  M54 16                      Subjective: Pt reports that he just got done with his hiking trip and his back felt good  Legs were sore  Still has pain with end range flexion, but believes it is improving  Objective: See treatment diary below      Assessment: Tolerated treatment well  Still painful at end range flexion  But progressing well with strengthening  Patient exhibited good technique with therapeutic exercises      Plan: Continue per plan of care  Precautions: Fall Concern, L UE surgery as a baby  [email or web address] Access Code: [email or web address]        Manuals 05/12 05/23  03/28 04/04 4/13 04/21 04/26 5/3 05/08                                                       Neuro Re-Ed             Postural Ed and Progression    10 min  5 min  10 min 10 min  5 min assessment x 15    Obstruction education             Back Account Analogy    done         Sleeping education             Cut finger    done         Hip AB /ext GTB  2x10 GTB 2x10     2x10  RTB 2x10  nv RTB 3x10   Sit to stand  3x10 3x10     2x10  2x12 nv 3x10   Squats  3x10 3x10     2x10  2x12 nv 3x10    Ther Ex             REIL    3x10 NE         REIL Pt OP 15 reps REIL  f/u 15 reps RFIL 15 reps REIL  f/u 15 reps RFIL  5x10   D B 3x10  3 x 10 3x10  3x10 with blocks  15 reps REIL  f/u 15 reps RFIL 15 reps REIL  f/u 15 reps RFIL   Progression? EIL c hips off center- for R             Possible Lateral (continue to monitor)                          Sustained?  10 min  10 min   5 min D B 10 min  10 min 10 min  10 min   10 min   Leg press   80# 2x10                         Slump Slider R 1\"x10 1\"x10        1\"x10   Ther Activity             LS Flex SGIS R             LE Strength                       " Modalities             CP/HP prn to LS

## 2023-05-31 ENCOUNTER — EVALUATION (OUTPATIENT)
Dept: PHYSICAL THERAPY | Facility: CLINIC | Age: 23
End: 2023-05-31

## 2023-05-31 DIAGNOSIS — M54.31 SCIATICA, RIGHT SIDE: Primary | ICD-10-CM

## 2023-05-31 DIAGNOSIS — M54.16 LUMBAR RADICULOPATHY: ICD-10-CM

## 2023-05-31 NOTE — PROGRESS NOTES
PT Evaluation     Today's date: 2023  Patient name: Malik Cedeño  : 2000  MRN: [id number]  Referring provider: Kevin Jack MD  Dx:   Encounter Diagnosis     ICD-10-CM    1  Sciatica, right side  M54 31       2  Lumbar radiculopathy  M54 16                      Assessment  Assessment details: Pt at this time demonstrates practical abolishment with pain levels, improved strength, range, flexibility as well as overall tolerance to activities  Pt at this time has achieved all goals sought out for him as well as by him and is likely safe to DC to HEP  If he is to have a decline in any form he is welcome to return as needed    Thank you very much for this kind and motivated referral         Impairments: abnormal or restricted ROM, activity intolerance, impaired physical strength, lacks appropriate home exercise program, pain with function, poor posture  and poor body mechanics  Understanding of Dx/Px/POC: good   Prognosis: good    Goals  STG 4 Weeks:  Decrease pain at worst to 6 -met  Improve range to all LS planes to min -met  Improve strength to improve R LE to 4/5 -met  Independent with HEP -met  LTG 8 Weeks:  Decrease pain at worst to 3 -met  Improve range to improve LS range to nil -met  Improve strength to R LE to 5/5  -met  Able to perform all desired activities with minimal to nil symptom exacerbation -met      Plan  Planned modality interventions: cryotherapy, TENS and thermotherapy: hydrocollator packs  Planned therapy interventions: joint mobilization, manual therapy, abdominal trunk stabilization, neuromuscular re-education, patient education, postural training, strengthening, stretching, therapeutic activities, therapeutic exercise, therapeutic training, home exercise program, graded motor, graded exercise, graded activity, functional ROM exercises, flexibility, activity modification, behavior modification and body mechanics training  Duration in weeks: 6  Treatment plan discussed with: patient        Subjective Evaluation    History of Present Illness  Date of onset: 3/8/2023  Mechanism of injury: Pt presents today stating that he has been feeling very well  Was able to go on vacation with lots of hiking and was entirely without pain  Pt reports that since his return he has not had any issues  He has been doing some resistance training but otherwise lot of cycling  Pt reports that sleeping is going well, and has no complaints at this time  No issues at work either which is primarily sitting, but no issues with transition  Quality of life: good    Pain  Current pain ratin  At best pain ratin ( )  At worst pain ratin  Quality: needle-like, sharp and dull ache  Relieving factors: medications and change in position  Aggravating factors: sitting, standing and lifting (bending over  )  Progression: improved      Diagnostic Tests  X-ray: normal  Treatments  Previous treatment: chiropractic  Patient Goals  Patient goals for therapy: decreased pain, increased motion, increased strength and return to sport/leisure activities          Objective     Active Range of Motion     Lumbar   Flexion:  WFL  Extension:  WFL  Left lateral flexion:  WFL  Right lateral flexion:  [facility]    Additional Active Range of Motion Details  Forward head, rounded shoulders, Lordosis  B/L Sensation intact to L3,4,5,S1,S2  DTR Patella 1+ L 2+ R  Josue L 3+ R 2+  Postural correction R low back , P R Leg symptoms NW  With walking R calve symptoms  // nil today  Repeated motion   Flex  Ext REIL P R leg NB  UE Blocks P NB   REIL Pt Op D B  Better range, strength, and R calve symptoms abolished  //  [place] followed by RFIL  Pain with standing LS Flexion is test  B  (Flex before P NW)  Nil with today  SB R  SB L  LE Strength  Hip   L 5 Flex 5 Ext 5 Abd 5 Add  R 4* Flex 5*Ext 4Abd 5 Add // all / no pain  -remains  -remains  LE Screen  Pain with Knee flexion R, rest of R LE 5/5    L LE strong and "painless  Joint Mobility  Palpation  STs  + Slump R   // today nil  Precautions: Fall Concern, L UE surgery as a baby  [email or web address] Access Code: [email or web address]        Manuals 05/12 05/23 5/31 03/28 04/04 4/13 04/21 04/26 5/3 05/08                                                       Neuro Re-Ed             Postural Ed and Progression   15 min assessment 10 min  5 min  10 min 10 min  5 min assessment x 15    Obstruction education             Back Account Analogy    done         Sleeping education             Cut finger    done         Hip AB /ext GTB  2x10 GTB 2x10 review    2x10  RTB 2x10  nv RTB 3x10   Sit to stand  3x10 3x10 review    2x10  2x12 nv 3x10   Squats  3x10 3x10 review    2x10  2x12 nv 3x10    Ther Ex             REIL    3x10 NE         REIL Pt OP 15 reps REIL  f/u 15 reps RFIL 15 reps REIL  f/u 15 reps RFIL review 5x10   D B 3x10  3 x 10 3x10  3x10 with blocks  15 reps REIL  f/u 15 reps RFIL 15 reps REIL  f/u 15 reps RFIL   Progression? EIL c hips off center- for R             Possible Lateral (continue to monitor)                          Sustained?  10 min  10 min   5 min D B 10 min  10 min 10 min  10 min   10 min   Leg press   80# 2x10                         Slump Slider R 1\"x10 1\"x10        1\"x10   Ther Activity             LS Flex SGIS R             LE Strength                                                     Modalities             CP/HP prn to LS                                        "

## 2023-06-20 ENCOUNTER — RA CDI HCC (OUTPATIENT)
Dept: OTHER | Facility: HOSPITAL | Age: 23
End: 2023-06-20

## 2023-06-20 NOTE — PROGRESS NOTES
[facility] 75  coding opportunities       Chart reviewed, no opportunity found: CHART REVIEWED, NO OPPORTUNITY FOUND        Patients Insurance        Commercial Insurance: Raj Peoples

## 2023-06-28 ENCOUNTER — OFFICE VISIT (OUTPATIENT)
Dept: INTERNAL MEDICINE CLINIC | Facility: CLINIC | Age: 23
End: 2023-06-28
Payer: COMMERCIAL

## 2023-06-28 VITALS
BODY MASS INDEX: 27.03 KG/M2 | DIASTOLIC BLOOD PRESSURE: 72 MMHG | SYSTOLIC BLOOD PRESSURE: 112 MMHG | HEART RATE: 77 BPM | WEIGHT: 172.2 LBS | HEIGHT: 67 IN | OXYGEN SATURATION: 99 %

## 2023-06-28 DIAGNOSIS — Z23 ENCOUNTER FOR IMMUNIZATION: ICD-10-CM

## 2023-06-28 DIAGNOSIS — Z00.00 WELLNESS EXAMINATION: Primary | ICD-10-CM

## 2023-06-28 DIAGNOSIS — M54.31 SCIATICA, RIGHT SIDE: ICD-10-CM

## 2023-06-28 PROCEDURE — 99395 PREV VISIT EST AGE 18-39: CPT | Performed by: INTERNAL MEDICINE

## 2023-06-28 NOTE — PROGRESS NOTES
Name: María Elena Cedeño      : 2000      MRN: [id number]  Encounter Provider: Joellen Carrel, MD  Encounter Date: 2023   [facility] [address] [place], [place] [address]     1  Wellness examination  Assessment & Plan:  Discussed preventative health, cancer screening, immunizations, and safety issues  I recommend Tdap vaccination if not done within the past 10 years, recommend yearly flu shot  2  Encounter for immunization    3  Sciatica, right side  Assessment & Plan: This improved with physical therapy        BMI Counseling: Body mass index is 27 kg/m²  The BMI is above normal  Nutrition recommendations include encouraging healthy choices of fruits and vegetables and moderation in carbohydrate intake  Exercise recommendations include exercising 3-5 times per week  Rationale for BMI follow-up plan is due to patient being overweight or obese  Subjective     Wellness: Patient eats a healthy diet, exercises, no smoking cigarettes, gets routine dental care    Review of Systems   Constitutional: Negative for chills, fatigue and fever  HENT: Negative for congestion, nosebleeds, postnasal drip, sore throat and trouble swallowing  Eyes: Negative for pain  Respiratory: Negative for cough, chest tightness, shortness of breath and wheezing  Cardiovascular: Negative for chest pain, palpitations and leg swelling  Gastrointestinal: Negative for abdominal pain, constipation, diarrhea, nausea and vomiting  Endocrine: Negative for polydipsia and polyuria  Genitourinary: Negative for dysuria, flank pain and hematuria  Musculoskeletal: Negative for arthralgias, back pain and myalgias  Skin: Negative for rash  Neurological: Negative for dizziness, tremors, light-headedness and headaches  Hematological: Does not bruise/bleed easily  Psychiatric/Behavioral: Negative for confusion and dysphoric mood  The patient is not nervous/anxious          Past Medical History: Diagnosis Date   • Allergic    • Back pain     2023   • Sciatica, right side 2/24/2023     Past Surgical History:   Procedure Laterality Date   • SHOULDER SURGERY Left     as a baby     Family History   Problem Relation Age of Onset   • No Known Problems Mother    • No Known Problems Father    • Asthma Brother      Social History     Socioeconomic History   • Marital status: Single     Spouse name: None   • Number of children: None   • Years of education: None   • Highest education level: None   Occupational History   • None   Tobacco Use   • Smoking status: Never     Passive exposure: Never   • Smokeless tobacco: Never   Vaping Use   • Vaping Use: Never used   Substance and Sexual Activity   • Alcohol use: Never   • Drug use: Never   • Sexual activity: Not Currently     Partners: Female   Other Topics Concern   • None   Social History Narrative   • None     Social Determinants of Health     Financial Resource Strain: Not on file   Food Insecurity: Not on file   Transportation Needs: Not on file   Physical Activity: Not on file   Stress: Not on file   Social Connections: Not on file   Intimate Partner Violence: Not on file   Housing Stability: Not on file     Current Outpatient Medications on File Prior to Visit   Medication Sig   • desloratadine (CLARINEX) 5 MG tablet Take 5 mg by mouth daily   • EPINEPHrine (EPIPEN) 0 3 mg/0 3 mL SOAJ Inject 0 3 mL (0 3 mg total) into a muscle once for 1 dose   • methocarbamol (ROBAXIN) 500 mg tablet Take 1 tablet (500 mg total) by mouth 3 (three) times a day as needed for muscle spasms (Patient not taking: Reported on 6/28/2023)     Allergies   Allergen Reactions   • Pistachio Nut (Diagnostic) - Food Allergy Facial Swelling     Immunization History   Administered Date(s) Administered   • COVID-19 PFIZER VACCINE 0 3 ML IM 03/28/2021, 04/18/2021, 11/27/2021   • H1N1, All Formulations 01/08/2010   • INFLUENZA 10/16/2013, 11/14/2015, 11/12/2016, 11/11/2017       Objective     BP "112/72   Pulse 77   Ht 5' 6 97\" (1 701 m)   Wt 78 1 kg (172 lb 3 2 oz)   SpO2 99%   BMI 27 00 kg/m²     Physical Exam  Vitals reviewed  Constitutional:       General: He is not in acute distress  Appearance: Normal appearance  He is well-developed  HENT:      Head: Normocephalic and atraumatic  Right Ear: Tympanic membrane, ear canal and external ear normal  There is no impacted cerumen  Left Ear: Tympanic membrane, ear canal and external ear normal  There is no impacted cerumen  Mouth/Throat:      Mouth: Mucous membranes are moist       Pharynx: Oropharynx is clear  Eyes:      General: No scleral icterus  Conjunctiva/sclera: Conjunctivae normal    Neck:      Thyroid: No thyromegaly  Trachea: No tracheal deviation  Cardiovascular:      Rate and Rhythm: Normal rate and regular rhythm  Heart sounds: Normal heart sounds  No murmur heard  Pulmonary:      Effort: Pulmonary effort is normal  No respiratory distress  Breath sounds: Normal breath sounds  No wheezing or rales  Abdominal:      General: Bowel sounds are normal       Palpations: Abdomen is soft  Tenderness: There is no abdominal tenderness  There is no guarding or rebound  Hernia: There is no hernia in the left inguinal area or right inguinal area  Genitourinary:     Testes: Normal    Musculoskeletal:      Cervical back: Normal range of motion and neck supple  Right lower leg: No edema  Left lower leg: No edema  Lymphadenopathy:      Cervical: No cervical adenopathy  Skin:     Coloration: Skin is not jaundiced or pale  Neurological:      General: No focal deficit present  Mental Status: He is alert and oriented to person, place, and time  Psychiatric:         Mood and Affect: Mood normal          Behavior: Behavior normal          Thought Content:  Thought content normal          Judgment: Judgment normal        Pavan Brown MD  "

## 2023-06-28 NOTE — ASSESSMENT & PLAN NOTE
Discussed preventative health, cancer screening, immunizations, and safety issues  I recommend Tdap vaccination if not done within the past 10 years, recommend yearly flu shot

## 2023-06-28 NOTE — PATIENT INSTRUCTIONS
Problem List Items Addressed This Visit          Nervous and Auditory    RESOLVED: Sciatica, right side     This improved with physical therapy            Other    Wellness examination - Primary     Discussed preventative health, cancer screening, immunizations, and safety issues  I recommend Tdap vaccination if not done within the past 10 years, recommend yearly flu shot            Other Visit Diagnoses       Encounter for immunization

## 2024-07-15 ENCOUNTER — OFFICE VISIT (OUTPATIENT)
Dept: INTERNAL MEDICINE CLINIC | Facility: CLINIC | Age: 24
End: 2024-07-15
Payer: COMMERCIAL

## 2024-07-15 VITALS
SYSTOLIC BLOOD PRESSURE: 120 MMHG | DIASTOLIC BLOOD PRESSURE: 88 MMHG | BODY MASS INDEX: 28.99 KG/M2 | OXYGEN SATURATION: 99 % | HEART RATE: 84 BPM | WEIGHT: 174 LBS | HEIGHT: 65 IN

## 2024-07-15 DIAGNOSIS — Z00.00 WELLNESS EXAMINATION: Primary | ICD-10-CM

## 2024-07-15 DIAGNOSIS — Z23 ENCOUNTER FOR IMMUNIZATION: ICD-10-CM

## 2024-07-15 DIAGNOSIS — E78.1 HYPERTRIGLYCERIDEMIA: ICD-10-CM

## 2024-07-15 DIAGNOSIS — E55.9 VITAMIN D DEFICIENCY: ICD-10-CM

## 2024-07-15 PROCEDURE — 90715 TDAP VACCINE 7 YRS/> IM: CPT

## 2024-07-15 PROCEDURE — 99395 PREV VISIT EST AGE 18-39: CPT | Performed by: INTERNAL MEDICINE

## 2024-07-15 PROCEDURE — 90471 IMMUNIZATION ADMIN: CPT

## 2024-07-15 NOTE — PATIENT INSTRUCTIONS
Problem List Items Addressed This Visit          Other    Wellness examination - Primary     Discussed preventative health, cancer screening, immunizations, and safety issues.  Recommend Tdap vaccination if not done within the past 10 years.  I recommend yearly flu shot.          Other Visit Diagnoses       Encounter for immunization        Relevant Orders    Tdap Vaccine greater than or equal to 6yo    Vitamin D deficiency        Relevant Orders    Vitamin D 25 hydroxy    Hypertriglyceridemia        Relevant Orders    CBC and differential    Comprehensive metabolic panel    Lipid Panel with Direct LDL reflex    TSH, 3rd generation with Free T4 reflex

## 2024-07-15 NOTE — ASSESSMENT & PLAN NOTE
Discussed preventative health, cancer screening, immunizations, and safety issues.  Recommend Tdap vaccination if not done within the past 10 years.  I recommend yearly flu shot.

## 2024-07-15 NOTE — PROGRESS NOTES
Ambulatory Visit  Name: Adan Cedeño      : 2000      MRN: [id number]  Encounter Provider: Peña Page MD  Encounter Date: 7/15/2024   [facility]    Assessment & Plan   1. Wellness examination  Assessment & Plan:  Discussed preventative health, cancer screening, immunizations, and safety issues.  Recommend Tdap vaccination if not done within the past 10 years.  I recommend yearly flu shot.  2. Encounter for immunization  -     Tdap Vaccine greater than or equal to 8yo  3. Vitamin D deficiency  -     Vitamin D 25 hydroxy; Future  4. Hypertriglyceridemia  -     CBC and differential; Future  -     Comprehensive metabolic panel; Future  -     Lipid Panel with Direct LDL reflex; Future  -     TSH, 3rd generation with Free T4 reflex; Future      Depression Screening and Follow-up Plan: Patient was screened for depression during today's encounter. They screened negative with a PHQ-2 score of 0.        History of Present Illness     Wellness: No smoking cigarettes, patient is active, eats a healthy diet, gets routine dental care      Review of Systems   Constitutional:  Negative for chills, fatigue and fever.   HENT:  Negative for congestion, nosebleeds, postnasal drip, sore throat and trouble swallowing.    Eyes:  Negative for pain.   Respiratory:  Negative for cough, chest tightness, shortness of breath and wheezing.    Cardiovascular:  Negative for chest pain, palpitations and leg swelling.   Gastrointestinal:  Negative for abdominal pain, constipation, diarrhea, nausea and vomiting.   Endocrine: Negative for polydipsia and polyuria.   Genitourinary:  Negative for dysuria, flank pain and hematuria.   Musculoskeletal:  Negative for arthralgias.   Skin:  Negative for rash.   Neurological:  Negative for dizziness, tremors, light-headedness and headaches.   Hematological:  Does not bruise/bleed easily.   Psychiatric/Behavioral:  Negative for confusion and dysphoric  "mood. The patient is not nervous/anxious.      Past Medical History:   Diagnosis Date   • Allergic    • Back pain     2023   • Sciatica, right side 2/24/2023     Past Surgical History:   Procedure Laterality Date   • SHOULDER SURGERY Left     as a baby     Family History   Problem Relation Age of Onset   • No Known Problems Mother    • No Known Problems Father    • Asthma Brother      Social History     Tobacco Use   • Smoking status: Never     Passive exposure: Never   • Smokeless tobacco: Never   Vaping Use   • Vaping status: Never Used   Substance and Sexual Activity   • Alcohol use: Never   • Drug use: Never   • Sexual activity: Not Currently     Partners: Female     Current Outpatient Medications on File Prior to Visit   Medication Sig   • desloratadine (CLARINEX) 5 MG tablet Take 5 mg by mouth daily   • EPINEPHrine (EPIPEN) 0.3 mg/0.3 mL SOAJ Inject 0.3 mL (0.3 mg total) into a muscle once for 1 dose   • methocarbamol (ROBAXIN) 500 mg tablet Take 1 tablet (500 mg total) by mouth 3 (three) times a day as needed for muscle spasms (Patient not taking: Reported on 6/28/2023)     Allergies   Allergen Reactions   • Pistachio Nut (Diagnostic) - Food Allergy Facial Swelling     Immunization History   Administered Date(s) Administered   • COVID-19 PFIZER VACCINE 0.3 ML IM 03/28/2021, 04/18/2021, 11/27/2021   • H1N1, All Formulations 01/08/2010   • INFLUENZA 10/16/2013, 11/14/2015, 11/12/2016, 11/11/2017     Objective     /88   Pulse 84   Ht 5' 5\" (1.651 m)   Wt 78.9 kg (174 lb)   SpO2 99%   BMI 28.96 kg/m²     Physical Exam  Vitals reviewed.   Constitutional:       General: He is not in acute distress.     Appearance: Normal appearance. He is well-developed.   HENT:      Head: Normocephalic and atraumatic.      Right Ear: External ear normal.      Left Ear: External ear normal.      Nose: Nose normal.   Eyes:      General: No scleral icterus.     Conjunctiva/sclera: Conjunctivae normal.   Neck:      Trachea: " No tracheal deviation.   Cardiovascular:      Rate and Rhythm: Normal rate and regular rhythm.      Heart sounds: Normal heart sounds. No murmur heard.  Pulmonary:      Effort: Pulmonary effort is normal. No respiratory distress.      Breath sounds: Normal breath sounds. No wheezing or rales.   Abdominal:      General: Bowel sounds are normal.      Palpations: Abdomen is soft. There is no mass.      Tenderness: There is no abdominal tenderness. There is no guarding.      Hernia: There is no hernia in the left inguinal area or right inguinal area.   Genitourinary:     Testes: Normal.   Musculoskeletal:      Cervical back: Normal range of motion and neck supple.      Right lower leg: No edema.      Left lower leg: No edema.   Lymphadenopathy:      Cervical: No cervical adenopathy.   Skin:     Coloration: Skin is not jaundiced or pale.   Neurological:      General: No focal deficit present.      Mental Status: He is alert and oriented to person, place, and time.   Psychiatric:         Mood and Affect: Mood normal.         Behavior: Behavior normal.         Thought Content: Thought content normal.         Judgment: Judgment normal.

## 2024-07-16 ENCOUNTER — APPOINTMENT (OUTPATIENT)
Dept: LAB | Facility: CLINIC | Age: 24
End: 2024-07-16
Payer: COMMERCIAL

## 2024-07-16 DIAGNOSIS — E55.9 VITAMIN D DEFICIENCY: ICD-10-CM

## 2024-07-16 DIAGNOSIS — E78.1 HYPERTRIGLYCERIDEMIA: ICD-10-CM

## 2024-07-16 LAB
25(OH)D3 SERPL-MCNC: 16.5 NG/ML (ref 30–100)
ALBUMIN SERPL BCG-MCNC: 4.5 G/DL (ref 3.5–5)
ALP SERPL-CCNC: 48 U/L (ref 34–104)
ALT SERPL W P-5'-P-CCNC: 25 U/L (ref 7–52)
ANION GAP SERPL CALCULATED.3IONS-SCNC: 8 MMOL/L (ref 4–13)
AST SERPL W P-5'-P-CCNC: 21 U/L (ref 13–39)
BASOPHILS # BLD AUTO: 0.02 THOUSANDS/ÂΜL (ref 0–0.1)
BASOPHILS NFR BLD AUTO: 0 % (ref 0–1)
BILIRUB SERPL-MCNC: 0.4 MG/DL (ref 0.2–1)
BUN SERPL-MCNC: 16 MG/DL (ref 5–25)
CALCIUM SERPL-MCNC: 9.2 MG/DL (ref 8.4–10.2)
CHLORIDE SERPL-SCNC: 104 MMOL/L (ref 96–108)
CHOLEST SERPL-MCNC: 193 MG/DL
CO2 SERPL-SCNC: 28 MMOL/L (ref 21–32)
CREAT SERPL-MCNC: 0.96 MG/DL (ref 0.6–1.3)
EOSINOPHIL # BLD AUTO: 0.11 THOUSAND/ÂΜL (ref 0–0.61)
EOSINOPHIL NFR BLD AUTO: 2 % (ref 0–6)
ERYTHROCYTE [DISTWIDTH] IN BLOOD BY AUTOMATED COUNT: 13 % (ref 11.6–15.1)
GFR SERPL CREATININE-BSD FRML MDRD: 110 ML/MIN/1.73SQ M
GLUCOSE P FAST SERPL-MCNC: 93 MG/DL (ref 65–99)
HCT VFR BLD AUTO: 50.5 % (ref 36.5–49.3)
HDLC SERPL-MCNC: 46 MG/DL
HGB BLD-MCNC: 16.6 G/DL (ref 12–17)
IMM GRANULOCYTES # BLD AUTO: 0.01 THOUSAND/UL (ref 0–0.2)
IMM GRANULOCYTES NFR BLD AUTO: 0 % (ref 0–2)
LDLC SERPL CALC-MCNC: 94 MG/DL (ref 0–100)
LYMPHOCYTES # BLD AUTO: 1.68 THOUSANDS/ÂΜL (ref 0.6–4.47)
LYMPHOCYTES NFR BLD AUTO: 28 % (ref 14–44)
MCH RBC QN AUTO: 31.3 PG (ref 26.8–34.3)
MCHC RBC AUTO-ENTMCNC: 32.9 G/DL (ref 31.4–37.4)
MCV RBC AUTO: 95 FL (ref 82–98)
MONOCYTES # BLD AUTO: 0.57 THOUSAND/ÂΜL (ref 0.17–1.22)
MONOCYTES NFR BLD AUTO: 9 % (ref 4–12)
NEUTROPHILS # BLD AUTO: 3.72 THOUSANDS/ÂΜL (ref 1.85–7.62)
NEUTS SEG NFR BLD AUTO: 61 % (ref 43–75)
NRBC BLD AUTO-RTO: 0 /100 WBCS
PLATELET # BLD AUTO: 300 THOUSANDS/UL (ref 149–390)
PMV BLD AUTO: 9.4 FL (ref 8.9–12.7)
POTASSIUM SERPL-SCNC: 3.9 MMOL/L (ref 3.5–5.3)
PROT SERPL-MCNC: 7.4 G/DL (ref 6.4–8.4)
RBC # BLD AUTO: 5.3 MILLION/UL (ref 3.88–5.62)
SODIUM SERPL-SCNC: 140 MMOL/L (ref 135–147)
TRIGL SERPL-MCNC: 263 MG/DL
TSH SERPL DL<=0.05 MIU/L-ACNC: 1.34 UIU/ML (ref 0.45–4.5)
WBC # BLD AUTO: 6.11 THOUSAND/UL (ref 4.31–10.16)

## 2024-07-16 PROCEDURE — 80053 COMPREHEN METABOLIC PANEL: CPT

## 2024-07-16 PROCEDURE — 84443 ASSAY THYROID STIM HORMONE: CPT

## 2024-07-16 PROCEDURE — 82306 VITAMIN D 25 HYDROXY: CPT

## 2024-07-16 PROCEDURE — 36415 COLL VENOUS BLD VENIPUNCTURE: CPT

## 2024-07-16 PROCEDURE — 80061 LIPID PANEL: CPT

## 2024-07-16 PROCEDURE — 85025 COMPLETE CBC W/AUTO DIFF WBC: CPT

## 2025-07-16 ENCOUNTER — OFFICE VISIT (OUTPATIENT)
Dept: INTERNAL MEDICINE CLINIC | Facility: CLINIC | Age: 25
End: 2025-07-16
Payer: COMMERCIAL

## 2025-07-16 VITALS
WEIGHT: 170.4 LBS | HEART RATE: 73 BPM | TEMPERATURE: 97.4 F | DIASTOLIC BLOOD PRESSURE: 74 MMHG | OXYGEN SATURATION: 98 % | HEIGHT: 67 IN | BODY MASS INDEX: 26.74 KG/M2 | SYSTOLIC BLOOD PRESSURE: 114 MMHG

## 2025-07-16 DIAGNOSIS — E55.9 VITAMIN D DEFICIENCY: ICD-10-CM

## 2025-07-16 DIAGNOSIS — E78.1 HYPERTRIGLYCERIDEMIA: ICD-10-CM

## 2025-07-16 DIAGNOSIS — Z00.00 WELLNESS EXAMINATION: Primary | ICD-10-CM

## 2025-07-16 PROCEDURE — 99395 PREV VISIT EST AGE 18-39: CPT | Performed by: INTERNAL MEDICINE

## 2025-07-16 NOTE — ASSESSMENT & PLAN NOTE
Recommend healthy diet and exercise for this, discussed rechecking in the future  Orders:  •  CBC and differential  •  Comprehensive metabolic panel  •  Lipid Panel with Direct LDL reflex

## 2025-07-16 NOTE — PROGRESS NOTES
Name: Adan Cedeño      : 2000      MRN: [id number]  Encounter Provider: Peña Page MD  Encounter Date: 2025   [facility]  :  Assessment & Plan  Wellness examination  Discussed preventative health, cancer screening, immunizations, and safety issues.  I recommend yearly flu shot.  Patient had tetanus 7/15/2024.  Most recent labs reviewed with patient       Hypertriglyceridemia  Recommend healthy diet and exercise for this, discussed rechecking in the future  Orders:  •  CBC and differential  •  Comprehensive metabolic panel  •  Lipid Panel with Direct LDL reflex    Vitamin D deficiency  Low when last checked, patient has not been taking vitamin D on a regular basis, I recommend 2000 units daily on a regular basis  Orders:  •  Vitamin D 25 hydroxy           History of Present Illness   Patient here for wellness.  No smoking cigarettes, patient is active, eats a healthy diet, gets routine dental care      Review of Systems   Constitutional:  Negative for chills, fatigue and fever.   HENT:  Negative for congestion, nosebleeds, postnasal drip, sore throat and trouble swallowing.    Eyes:  Negative for pain.   Respiratory:  Negative for cough, chest tightness, shortness of breath and wheezing.    Cardiovascular:  Negative for chest pain, palpitations and leg swelling.   Gastrointestinal:  Negative for abdominal pain, constipation, diarrhea, nausea and vomiting.   Endocrine: Negative for polydipsia and polyuria.   Genitourinary:  Negative for dysuria, flank pain and hematuria.   Musculoskeletal:  Negative for arthralgias.   Skin:  Negative for rash.   Neurological:  Negative for dizziness, tremors, light-headedness and headaches.   Hematological:  Does not bruise/bleed easily.   Psychiatric/Behavioral:  Negative for confusion and dysphoric mood. The patient is not nervous/anxious.        Objective   /74 (BP Location: Left arm, Patient Position: Sitting,  "Cuff Size: Standard)   Pulse 73   Temp (!) 97.4 °F (36.3 °C) (Tympanic)   Ht 5' 7.25\" (1.708 m)   Wt 77.3 kg (170 lb 6.4 oz)   SpO2 98%   BMI 26.49 kg/m²      Physical Exam  Vitals reviewed.   Constitutional:       General: He is not in acute distress.     Appearance: Normal appearance. He is well-developed.   HENT:      Head: Normocephalic and atraumatic.      Right Ear: External ear normal.      Left Ear: External ear normal.      Nose: Nose normal.      Mouth/Throat:      Mouth: Mucous membranes are moist.      Pharynx: No oropharyngeal exudate or posterior oropharyngeal erythema.     Eyes:      General: No scleral icterus.     Conjunctiva/sclera: Conjunctivae normal.     Neck:      Trachea: No tracheal deviation.     Cardiovascular:      Rate and Rhythm: Normal rate and regular rhythm.      Heart sounds: Normal heart sounds. No murmur heard.  Pulmonary:      Effort: Pulmonary effort is normal. No respiratory distress.      Breath sounds: Normal breath sounds. No wheezing or rales.   Abdominal:      General: Bowel sounds are normal.      Palpations: Abdomen is soft. There is no mass.      Tenderness: There is no abdominal tenderness. There is no guarding.      Hernia: There is no hernia in the left inguinal area or right inguinal area.   Genitourinary:     Testes: Normal.     Musculoskeletal:      Cervical back: Normal range of motion and neck supple.      Right lower leg: No edema.      Left lower leg: No edema.   Lymphadenopathy:      Cervical: No cervical adenopathy.     Neurological:      General: No focal deficit present.      Mental Status: He is alert and oriented to person, place, and time.     Psychiatric:         Mood and Affect: Mood normal.         Behavior: Behavior normal.         Thought Content: Thought content normal.         Judgment: Judgment normal.         Answers submitted by the patient for this visit:  Annual Physical (Submitted on 7/12/2025)  Diet/Nutrition choices: no special " diet  Exercise choices: walking  Sleep choices: 4-6 hours of sleep on average  Hearing choices: normal hearing bilateral ears  Vision choices: most recent eye exam < 1 year ago, wears glasses, wears contacts  Dental choices: regular dental visits  Do you currently have an OB/GYN provider that you routinely follow with?: No  Any history of sexual transmitted disease/infection?: No  Urinary symptoms: none  Do you have an advance directive/living will?: No  Do you have a durable power of  (POA)?: No

## 2025-07-16 NOTE — ASSESSMENT & PLAN NOTE
Discussed preventative health, cancer screening, immunizations, and safety issues.  I recommend yearly flu shot.  Patient had tetanus 7/15/2024.  Most recent labs reviewed with patient

## 2025-07-16 NOTE — ASSESSMENT & PLAN NOTE
Low when last checked, patient has not been taking vitamin D on a regular basis, I recommend 2000 units daily on a regular basis  Orders:  •  Vitamin D 25 hydroxy

## 2025-07-16 NOTE — PATIENT INSTRUCTIONS
Problem List Items Addressed This Visit          Other    Wellness examination - Primary    Discussed preventative health, cancer screening, immunizations, and safety issues.  I recommend yearly flu shot.  Patient had tetanus 7/15/2024.  Most recent labs reviewed with patient         Hypertriglyceridemia    Recommend healthy diet and exercise for this, discussed rechecking in the future         Relevant Orders    CBC and differential    Comprehensive metabolic panel    Lipid Panel with Direct LDL reflex    Vitamin D deficiency    Low when last checked, patient has not been taking vitamin D on a regular basis, I recommend 2000 units daily on a regular basis         Relevant Orders    Vitamin D 25 hydroxy

## 2025-07-17 ENCOUNTER — APPOINTMENT (OUTPATIENT)
Dept: LAB | Facility: CLINIC | Age: 25
End: 2025-07-17
Attending: INTERNAL MEDICINE
Payer: COMMERCIAL

## 2025-07-17 LAB
25(OH)D3 SERPL-MCNC: 12.8 NG/ML (ref 30–100)
ALBUMIN SERPL BCG-MCNC: 4.5 G/DL (ref 3.5–5)
ALP SERPL-CCNC: 45 U/L (ref 34–104)
ALT SERPL W P-5'-P-CCNC: 18 U/L (ref 7–52)
ANION GAP SERPL CALCULATED.3IONS-SCNC: 6 MMOL/L (ref 4–13)
AST SERPL W P-5'-P-CCNC: 17 U/L (ref 13–39)
BASOPHILS # BLD AUTO: 0.02 THOUSANDS/ÂΜL (ref 0–0.1)
BASOPHILS NFR BLD AUTO: 0 % (ref 0–1)
BILIRUB SERPL-MCNC: 0.47 MG/DL (ref 0.2–1)
BUN SERPL-MCNC: 23 MG/DL (ref 5–25)
CALCIUM SERPL-MCNC: 9.1 MG/DL (ref 8.4–10.2)
CHLORIDE SERPL-SCNC: 105 MMOL/L (ref 96–108)
CHOLEST SERPL-MCNC: 163 MG/DL (ref ?–200)
CO2 SERPL-SCNC: 28 MMOL/L (ref 21–32)
CREAT SERPL-MCNC: 0.87 MG/DL (ref 0.6–1.3)
EOSINOPHIL # BLD AUTO: 0.09 THOUSAND/ÂΜL (ref 0–0.61)
EOSINOPHIL NFR BLD AUTO: 2 % (ref 0–6)
ERYTHROCYTE [DISTWIDTH] IN BLOOD BY AUTOMATED COUNT: 12.9 % (ref 11.6–15.1)
GFR SERPL CREATININE-BSD FRML MDRD: 119 ML/MIN/1.73SQ M
GLUCOSE P FAST SERPL-MCNC: 93 MG/DL (ref 65–99)
HCT VFR BLD AUTO: 49 % (ref 36.5–49.3)
HDLC SERPL-MCNC: 41 MG/DL
HGB BLD-MCNC: 16 G/DL (ref 12–17)
IMM GRANULOCYTES # BLD AUTO: 0.02 THOUSAND/UL (ref 0–0.2)
IMM GRANULOCYTES NFR BLD AUTO: 0 % (ref 0–2)
LDLC SERPL CALC-MCNC: 88 MG/DL (ref 0–100)
LYMPHOCYTES # BLD AUTO: 1.43 THOUSANDS/ÂΜL (ref 0.6–4.47)
LYMPHOCYTES NFR BLD AUTO: 23 % (ref 14–44)
MCH RBC QN AUTO: 31.3 PG (ref 26.8–34.3)
MCHC RBC AUTO-ENTMCNC: 32.7 G/DL (ref 31.4–37.4)
MCV RBC AUTO: 96 FL (ref 82–98)
MONOCYTES # BLD AUTO: 0.56 THOUSAND/ÂΜL (ref 0.17–1.22)
MONOCYTES NFR BLD AUTO: 9 % (ref 4–12)
NEUTROPHILS # BLD AUTO: 4.04 THOUSANDS/ÂΜL (ref 1.85–7.62)
NEUTS SEG NFR BLD AUTO: 66 % (ref 43–75)
NRBC BLD AUTO-RTO: 0 /100 WBCS
PLATELET # BLD AUTO: 310 THOUSANDS/UL (ref 149–390)
PMV BLD AUTO: 9 FL (ref 8.9–12.7)
POTASSIUM SERPL-SCNC: 4.1 MMOL/L (ref 3.5–5.3)
PROT SERPL-MCNC: 7.2 G/DL (ref 6.4–8.4)
RBC # BLD AUTO: 5.12 MILLION/UL (ref 3.88–5.62)
SODIUM SERPL-SCNC: 139 MMOL/L (ref 135–147)
TRIGL SERPL-MCNC: 168 MG/DL (ref ?–150)
WBC # BLD AUTO: 6.16 THOUSAND/UL (ref 4.31–10.16)

## 2025-07-17 PROCEDURE — 82306 VITAMIN D 25 HYDROXY: CPT | Performed by: INTERNAL MEDICINE

## 2025-07-17 PROCEDURE — 85025 COMPLETE CBC W/AUTO DIFF WBC: CPT | Performed by: INTERNAL MEDICINE

## 2025-07-17 PROCEDURE — 80053 COMPREHEN METABOLIC PANEL: CPT | Performed by: INTERNAL MEDICINE

## 2025-07-17 PROCEDURE — 80061 LIPID PANEL: CPT | Performed by: INTERNAL MEDICINE

## 2025-07-17 PROCEDURE — 36415 COLL VENOUS BLD VENIPUNCTURE: CPT | Performed by: INTERNAL MEDICINE
